# Patient Record
Sex: MALE | Race: WHITE | NOT HISPANIC OR LATINO | Employment: OTHER | URBAN - METROPOLITAN AREA
[De-identification: names, ages, dates, MRNs, and addresses within clinical notes are randomized per-mention and may not be internally consistent; named-entity substitution may affect disease eponyms.]

---

## 2018-05-14 ENCOUNTER — APPOINTMENT (OUTPATIENT)
Dept: RADIOLOGY | Facility: CLINIC | Age: 48
End: 2018-05-14
Payer: COMMERCIAL

## 2018-05-14 ENCOUNTER — OFFICE VISIT (OUTPATIENT)
Dept: OBGYN CLINIC | Facility: CLINIC | Age: 48
End: 2018-05-14
Payer: COMMERCIAL

## 2018-05-14 VITALS
SYSTOLIC BLOOD PRESSURE: 105 MMHG | HEART RATE: 85 BPM | HEIGHT: 70 IN | BODY MASS INDEX: 28.58 KG/M2 | DIASTOLIC BLOOD PRESSURE: 71 MMHG | WEIGHT: 199.6 LBS

## 2018-05-14 DIAGNOSIS — M25.511 RIGHT SHOULDER PAIN, UNSPECIFIED CHRONICITY: ICD-10-CM

## 2018-05-14 DIAGNOSIS — M25.511 RIGHT SHOULDER PAIN, UNSPECIFIED CHRONICITY: Primary | ICD-10-CM

## 2018-05-14 PROCEDURE — 73030 X-RAY EXAM OF SHOULDER: CPT

## 2018-05-14 PROCEDURE — 99204 OFFICE O/P NEW MOD 45 MIN: CPT | Performed by: ORTHOPAEDIC SURGERY

## 2018-05-14 NOTE — PROGRESS NOTES
Assessment/Plan:  1  Right shoulder pain, unspecified chronicity  XR shoulder 2+ vw right    MRI shoulder right wo contrast       Caleb Blevins has a right shoulder which likely has a traumatic rotator cuff tear  He is profoundly weak in testing abduction  He did have a traumatic incident that occurred when he was going off of a higher ledge into the water  We spoke about possible need for rotator cuff repair surgery  He will follow up with me after the MRI has been done and read  Since he lives quite a distance from here, we will have him follow up over the phone for his results initially  Subjective:   Mayra Sapp is a 50 y o  male who presents with right shoulder pain over the past 4 weeks  He was on vacation in [facility] when he jumped off of a ledge into water and landed with his arms outstretched  He unfortunately has had pain into the right shoulder and weakness ever since  He has difficulty with abduction  His pain is a sharp and moderate and intermittent discomfort  It is better with rest but can radiate down the arm  It is worse with abduction  He notices significant weakness  Review of Systems   Constitutional: Negative for chills, fever and unexpected weight change  HENT: Negative for hearing loss, nosebleeds and sore throat  Eyes: Negative for pain, redness and visual disturbance  Respiratory: Negative for cough, shortness of breath and wheezing  Cardiovascular: Negative for chest pain, palpitations and leg swelling  Gastrointestinal: Negative for abdominal pain, nausea and vomiting  Endocrine: Negative for polyphagia and polyuria  Genitourinary: Negative for dysuria and hematuria  Musculoskeletal:        See HPI   Skin: Negative for rash and wound  Neurological: Negative for dizziness, numbness and headaches  Psychiatric/Behavioral: Negative for decreased concentration and suicidal ideas  The patient is not nervous/anxious  No past medical history on file      Past Surgical History:   Procedure Laterality Date    FEMUR FRACTURE SURGERY      WRIST SURGERY         Family History   Problem Relation Age of Onset    Macular degeneration Mother     Emphysema Father        Social History     Occupational History    Not on file  Social History Main Topics    Smoking status: Never Smoker    Smokeless tobacco: Never Used    Alcohol use Yes      Comment: RARE    Drug use: No    Sexual activity: Not on file       No current outpatient prescriptions on file  No Known Allergies    Objective:  Vitals:    05/14/18 1103   BP: 105/71   Pulse: 85       Right Shoulder Exam     Tenderness   The patient is experiencing no tenderness  Range of Motion   Active Abduction:  120 abnormal   Forward Flexion:  130 abnormal   External Rotation: normal   Internal Rotation 0 degrees:  L3 abnormal     Muscle Strength   Abduction: 3/5   Internal Rotation: 5/5   External Rotation: 5/5     Tests   Drop Arm: positive  Hawkin's test: negative  Impingement: negative    Other   Sensation: normal  Pulse: present    Comments:  I can palpate the pectoralis muscle quite readily and is nontender  Physical Exam   Constitutional: He is oriented to person, place, and time  Vital signs are normal  He appears well-developed and well-nourished  HENT:   Head: Normocephalic and atraumatic  Eyes: Conjunctivae and EOM are normal    Neck: Normal range of motion  Cardiovascular: Intact distal pulses  Pulmonary/Chest: Effort normal  No respiratory distress  Neurological: He is alert and oriented to person, place, and time  Skin: Skin is warm and dry  Psychiatric: He has a normal mood and affect   His behavior is normal  Judgment and thought content normal        I have personally reviewed pertinent films in PACS and my interpretation is as follows:  Right shoulder x-rays with mild arthritic change of the glenohumeral joint

## 2018-05-16 ENCOUNTER — HOSPITAL ENCOUNTER (OUTPATIENT)
Dept: MRI IMAGING | Facility: HOSPITAL | Age: 48
Discharge: HOME | End: 2018-05-16
Attending: ORTHOPAEDIC SURGERY
Payer: COMMERCIAL

## 2018-05-16 DIAGNOSIS — M25.511 RIGHT SHOULDER PAIN, UNSPECIFIED CHRONICITY: ICD-10-CM

## 2018-05-16 PROCEDURE — 73221 MRI JOINT UPR EXTREM W/O DYE: CPT

## 2018-05-30 ENCOUNTER — OFFICE VISIT (OUTPATIENT)
Dept: OBGYN CLINIC | Facility: CLINIC | Age: 48
End: 2018-05-30
Payer: COMMERCIAL

## 2018-05-30 VITALS
HEIGHT: 70 IN | HEART RATE: 74 BPM | BODY MASS INDEX: 27.43 KG/M2 | DIASTOLIC BLOOD PRESSURE: 69 MMHG | WEIGHT: 191.6 LBS | SYSTOLIC BLOOD PRESSURE: 107 MMHG

## 2018-05-30 DIAGNOSIS — S43.431A SUPERIOR GLENOID LABRUM LESION OF RIGHT SHOULDER, INITIAL ENCOUNTER: Primary | ICD-10-CM

## 2018-05-30 PROBLEM — S46.111D RUPTURE LONG HEAD BICEPS TENDON, RIGHT, SUBSEQUENT ENCOUNTER: Status: ACTIVE | Noted: 2018-05-30

## 2018-05-30 PROBLEM — M75.111 PARTIAL NONTRAUMATIC TEAR OF RIGHT ROTATOR CUFF: Status: ACTIVE | Noted: 2018-05-30

## 2018-05-30 PROCEDURE — 99214 OFFICE O/P EST MOD 30 MIN: CPT | Performed by: ORTHOPAEDIC SURGERY

## 2018-05-30 NOTE — PROGRESS NOTES
Assessment/Plan:  1  Superior glenoid labrum lesion of right shoulder, initial encounter       Scribe Attestation    I,:   Rikki Norman am acting as a scribe while in the presence of the attending physician :        I,:   Hafsa Neumann MD personally performed the services described in this documentation    as scribed in my presence :            Lara Mcdaniels has a SLAP tear of the glenoid labrum, near complete tear of the long head of biceps tendon, moderate glenohumeral joint osteoarthritis, and a tiny interstitial tear in the posterior supraspinatus of his right shoulder  At this time, he would like to proceed with surgery  I will perform a Right Shoulder Arthroscopy with Biceps Tendonesis with a  possible rotator cuff repair  I spoke to Lara Mcdaniels that I will have to make sure the biceps tendon is still attached in order to repair it  I did let him know that if the biceps is not found intra-articularly, I would not attempt to find it extra-articularly and perform a tenodesis more distally in the arm  We would debride any labral pathology and evaluate for rotator cuff pathology if that was the case  He understood  I spoke to him that he will be in a sling for 3 weeks and then begin physical therapy  Lara Mcdaniels understood the risks associated with the surgery which are: bleeding, infection, blood clots, nerve injury, persistent stiffness and weakness, failure to achieve the anticipated results and the need for recurrent surgery  He understood and had no further questions  Surgery will be scheduled  Subjective:   Cruz Blanchard is a 50 y o  male who presents here for his right shoulder pain  His pain is a sharp and moderate and intermittent discomfort  It is better with rest but can radiate down the arm  It is worse with abduction  He notices significant weakness  He injured his arm while in [facility] on vacation  Review of Systems   Constitutional: Negative  HENT: Negative  Eyes: Negative  Respiratory: Negative  Cardiovascular: Negative  Gastrointestinal: Negative  Endocrine: Negative  Genitourinary: Negative  Musculoskeletal: Negative  As noted in HPI   Skin: Negative  Allergic/Immunologic: Negative  Neurological: Negative  Hematological: Negative  Psychiatric/Behavioral: Negative  All other systems reviewed and are negative  History reviewed  No pertinent past medical history  Past Surgical History:   Procedure Laterality Date    FEMUR FRACTURE SURGERY      WRIST SURGERY         Family History   Problem Relation Age of Onset    Macular degeneration Mother     Emphysema Father        Social History     Occupational History    Not on file  Social History Main Topics    Smoking status: Never Smoker    Smokeless tobacco: Never Used    Alcohol use Yes      Comment: RARE    Drug use: No    Sexual activity: Not on file       No current outpatient prescriptions on file  No Known Allergies    Objective:  Vitals:    05/30/18 0850   BP: 107/69   Pulse: 74       Right Shoulder Exam     Tenderness   The patient is experiencing no tenderness  Range of Motion   Forward Flexion: 20   Internal Rotation 0 degrees: L1     Muscle Strength   Abduction: 5/5   Internal Rotation: 5/5   External Rotation: 5/5   Supraspinatus: 5/5   Subscapularis: 5/5   Biceps: 5/5     Other   Erythema: absent  Scars: absent  Sensation: normal  Pulse: present    Comments:  + [employer]            Physical Exam   Constitutional: He is oriented to person, place, and time  Vital signs are normal  He appears well-developed and well-nourished  HENT:   Head: Normocephalic and atraumatic  Eyes: Conjunctivae and EOM are normal  Pupils are equal, round, and reactive to light  Neck: Normal range of motion  Cardiovascular: Normal rate, normal heart sounds and intact distal pulses  Pulmonary/Chest: Effort normal  No respiratory distress  He has wheezes  Musculoskeletal: Normal range of motion  Neurological: He is alert and oriented to person, place, and time  Skin: Skin is warm and dry  Psychiatric: He has a normal mood and affect  His behavior is normal  Judgment and thought content normal        I have personally reviewed pertinent films in PACS and my interpretation is as follows:  MRI of Kendrick's right shoulder show a SLAP tear of the glenoid labrum, near complete tear of the long head of biceps tendon, moderate glenohumeral joint osteoarthritis, and a tiny interstitial tear in the posterior supraspinatus

## 2018-06-15 NOTE — PRE-PROCEDURE INSTRUCTIONS
No outpatient prescriptions have been marked as taking for the 6/21/18 encounter [facility] HOSPITAL Encounter)  Pre op instructions given  Information for On Q use and removal given   wife Alejandra Morris [phone] Surgical Experience    The following information was developed to assist you to prepare for your operation  What do I need to do before coming to the hospital?   Arrange for a responsible person to drive you to and from the hospital    Arrange care for your children at home  Children are not allowed in the recovery areas of the hospital   Plan to wear clothing that is easy to put on and take off  If you are having shoulder surgery, wear a shirt that buttons or zippers in the front  Bathing  o Shower the evening before and the morning of your surgery with an antibacterial soap  Please refer to the Pre Op Showering Instructions for Surgery Patients Sheet   o Remove nail polish and all body piercing jewelry  o Do not shave any body part for at least 24 hours before surgery-this includes face, arms, legs and upper body  Food  o Nothing to eat or drink after midnight the night before your surgery  This includes candy and chewing gum  o Exception: If your surgery is after 12:00pm (noon), you may have clear liquids such as 7-Up®, ginger ale, apple or cranberry juice, Jell-O®, water, or clear broth until 8:00 am  o Do not drink milk or juice with pulp on the morning before surgery  o Do not drink alcohol 24 hours before surgery  Medicine  o Follow instructions you received from your surgeon about which medicines you may take on the day of surgery  o If instructed to take medicine on the morning of surgery, take pills with just a small sip of water   Call your prescribing doctor for specific infroamtion on what to do if you take insulin    What should I bring to the hospital?    Bring:  Camelia Bouquet or a walker, if you have them, for foot or knee surgery   A list of the daily medicines, vitamins, minerals, herbals and nutritional supplements you take  Include the dosages of medicines and the time you take them each day   Glasses, dentures or hearing aids   Minimal clothing; you will be wearing hospital sleepwear   Photo ID; required to verify your identity   If you have a Living Will or Power of , bring a copy of the documents   If you have an ostomy, bring an extra pouch and any supplies you use    Do not bring   Medicines or inhalers   Money, valuables or jewelry    What other information should I know about the day of surgery?  Notify your surgeons if you develop a cold, sore throat, cough, fever, rash or any other illness   Report to the Ambulatory Surgical/Same Day Surgery Unit   You will be instructed to stop at Registration only if you have not been pre-registered   Inform your  fi they do not stay that they will be asked by the staff to leave a phone number where they can be reached   Be available to be reached before surgery  In the event the operating room schedule changes, you may be asked to come in earlier or later than expected    *It is important to tell your doctor and others involved in your health care if you are taking or have been taking any non-prescription drugs, vitamins, minerals, herbals or other nutritional supplements   Any of these may interact with some food or medicines and cause a reaction

## 2018-06-19 ENCOUNTER — ANESTHESIA EVENT (OUTPATIENT)
Dept: PERIOP | Facility: HOSPITAL | Age: 48
End: 2018-06-19
Payer: COMMERCIAL

## 2018-06-19 DIAGNOSIS — S46.111D RUPTURE LONG HEAD BICEPS TENDON, RIGHT, SUBSEQUENT ENCOUNTER: Primary | ICD-10-CM

## 2018-06-20 NOTE — ANESTHESIA PREPROCEDURE EVALUATION
Review of Systems/Medical History  Patient summary reviewed  Chart reviewed      Cardiovascular  Exercise tolerance (METS): >4,     Pulmonary  Not a smoker , No recent URI ,        GI/Hepatic            Endo/Other     GYN       Hematology   Musculoskeletal       Neurology   Psychology           Physical Exam    Airway    Mallampati score: II  TM Distance: >3 FB  Neck ROM: full     Dental       Cardiovascular  Rhythm: regular, Rate: normal,     Pulmonary  Breath sounds clear to auscultation,     Other Findings        Anesthesia Plan  ASA Score- 1     Anesthesia Type- IV sedation with anesthesia and regional with ASA Monitors  Additional Monitors:   Airway Plan:         Plan Factors-    Induction- intravenous  Postoperative Plan-     Informed Consent- Anesthetic plan and risks discussed with patient  I personally reviewed this patient with the CRNA  Discussed and agreed on the Anesthesia Plan with the CRNA  Yonathan Menjivar

## 2018-06-21 ENCOUNTER — HOSPITAL ENCOUNTER (OUTPATIENT)
Facility: HOSPITAL | Age: 48
Setting detail: OUTPATIENT SURGERY
Discharge: HOME | End: 2018-06-21
Attending: ORTHOPAEDIC SURGERY | Admitting: ORTHOPAEDIC SURGERY
Payer: COMMERCIAL

## 2018-06-21 ENCOUNTER — ANESTHESIA (OUTPATIENT)
Dept: PERIOP | Facility: HOSPITAL | Age: 48
End: 2018-06-21
Payer: COMMERCIAL

## 2018-06-21 VITALS
WEIGHT: 190 LBS | RESPIRATION RATE: 18 BRPM | BODY MASS INDEX: 27.26 KG/M2 | SYSTOLIC BLOOD PRESSURE: 148 MMHG | TEMPERATURE: 97 F | OXYGEN SATURATION: 97 % | DIASTOLIC BLOOD PRESSURE: 96 MMHG | HEART RATE: 66 BPM

## 2018-06-21 PROCEDURE — 29823 SHO ARTHRS SRG XTNSV DBRDMT: CPT | Performed by: ORTHOPAEDIC SURGERY

## 2018-06-21 PROCEDURE — 29823 SHO ARTHRS SRG XTNSV DBRDMT: CPT | Performed by: PHYSICIAN ASSISTANT

## 2018-06-21 RX ORDER — SODIUM CHLORIDE 9 MG/ML
75 INJECTION, SOLUTION INTRAVENOUS CONTINUOUS
Status: DISCONTINUED | OUTPATIENT
Start: 2018-06-21 | End: 2018-06-21 | Stop reason: HOSPADM

## 2018-06-21 RX ORDER — ONDANSETRON 2 MG/ML
4 INJECTION INTRAMUSCULAR; INTRAVENOUS ONCE AS NEEDED
Status: DISCONTINUED | OUTPATIENT
Start: 2018-06-21 | End: 2018-06-21 | Stop reason: HOSPADM

## 2018-06-21 RX ORDER — LIDOCAINE HYDROCHLORIDE 10 MG/ML
INJECTION, SOLUTION INFILTRATION; PERINEURAL AS NEEDED
Status: DISCONTINUED | OUTPATIENT
Start: 2018-06-21 | End: 2018-06-21 | Stop reason: SURG

## 2018-06-21 RX ORDER — DEXAMETHASONE SODIUM PHOSPHATE 4 MG/ML
INJECTION, SOLUTION INTRA-ARTICULAR; INTRALESIONAL; INTRAMUSCULAR; INTRAVENOUS; SOFT TISSUE AS NEEDED
Status: DISCONTINUED | OUTPATIENT
Start: 2018-06-21 | End: 2018-06-21 | Stop reason: SURG

## 2018-06-21 RX ORDER — HYDROMORPHONE HCL 110MG/55ML
0.5 PATIENT CONTROLLED ANALGESIA SYRINGE INTRAVENOUS
Status: DISCONTINUED | OUTPATIENT
Start: 2018-06-21 | End: 2018-06-21 | Stop reason: HOSPADM

## 2018-06-21 RX ORDER — EPINEPHRINE 1 MG/ML
INJECTION, SOLUTION, CONCENTRATE INTRAVENOUS AS NEEDED
Status: DISCONTINUED | OUTPATIENT
Start: 2018-06-21 | End: 2018-06-21 | Stop reason: SURG

## 2018-06-21 RX ORDER — PROPOFOL 10 MG/ML
INJECTION, EMULSION INTRAVENOUS CONTINUOUS PRN
Status: DISCONTINUED | OUTPATIENT
Start: 2018-06-21 | End: 2018-06-21 | Stop reason: SURG

## 2018-06-21 RX ORDER — FENTANYL CITRATE 50 UG/ML
INJECTION, SOLUTION INTRAMUSCULAR; INTRAVENOUS AS NEEDED
Status: DISCONTINUED | OUTPATIENT
Start: 2018-06-21 | End: 2018-06-21 | Stop reason: SURG

## 2018-06-21 RX ORDER — KETAMINE HYDROCHLORIDE 50 MG/ML
INJECTION, SOLUTION, CONCENTRATE INTRAMUSCULAR; INTRAVENOUS AS NEEDED
Status: DISCONTINUED | OUTPATIENT
Start: 2018-06-21 | End: 2018-06-21 | Stop reason: SURG

## 2018-06-21 RX ORDER — FENTANYL CITRATE/PF 50 MCG/ML
25 SYRINGE (ML) INJECTION
Status: DISCONTINUED | OUTPATIENT
Start: 2018-06-21 | End: 2018-06-21 | Stop reason: HOSPADM

## 2018-06-21 RX ORDER — OXYCODONE HYDROCHLORIDE AND ACETAMINOPHEN 5; 325 MG/1; MG/1
1 TABLET ORAL EVERY 4 HOURS PRN
Status: DISCONTINUED | OUTPATIENT
Start: 2018-06-21 | End: 2018-06-21 | Stop reason: HOSPADM

## 2018-06-21 RX ORDER — DIPHENHYDRAMINE HYDROCHLORIDE 50 MG/ML
12.5 INJECTION INTRAMUSCULAR; INTRAVENOUS ONCE AS NEEDED
Status: DISCONTINUED | OUTPATIENT
Start: 2018-06-21 | End: 2018-06-21 | Stop reason: HOSPADM

## 2018-06-21 RX ORDER — ROPIVACAINE HYDROCHLORIDE 5 MG/ML
INJECTION, SOLUTION EPIDURAL; INFILTRATION; PERINEURAL AS NEEDED
Status: DISCONTINUED | OUTPATIENT
Start: 2018-06-21 | End: 2018-06-21 | Stop reason: SURG

## 2018-06-21 RX ORDER — PROPOFOL 10 MG/ML
INJECTION, EMULSION INTRAVENOUS AS NEEDED
Status: DISCONTINUED | OUTPATIENT
Start: 2018-06-21 | End: 2018-06-21 | Stop reason: SURG

## 2018-06-21 RX ADMIN — PROPOFOL 100 MG: 10 INJECTION, EMULSION INTRAVENOUS at 11:26

## 2018-06-21 RX ADMIN — KETAMINE HYDROCHLORIDE 20 MG: 50 INJECTION INTRAMUSCULAR; INTRAVENOUS at 11:20

## 2018-06-21 RX ADMIN — DEXAMETHASONE SODIUM PHOSPHATE 4 MG: 4 INJECTION, SOLUTION INTRA-ARTICULAR; INTRALESIONAL; INTRAMUSCULAR; INTRAVENOUS; SOFT TISSUE at 10:45

## 2018-06-21 RX ADMIN — FENTANYL CITRATE 25 MCG: 50 INJECTION, SOLUTION INTRAMUSCULAR; INTRAVENOUS at 11:30

## 2018-06-21 RX ADMIN — SODIUM CHLORIDE 75 ML/HR: 0.9 INJECTION, SOLUTION INTRAVENOUS at 09:21

## 2018-06-21 RX ADMIN — FENTANYL CITRATE 25 MCG: 50 INJECTION, SOLUTION INTRAMUSCULAR; INTRAVENOUS at 11:35

## 2018-06-21 RX ADMIN — PROPOFOL 20 MG: 10 INJECTION, EMULSION INTRAVENOUS at 11:25

## 2018-06-21 RX ADMIN — PROPOFOL 100 MG: 10 INJECTION, EMULSION INTRAVENOUS at 11:20

## 2018-06-21 RX ADMIN — PROPOFOL 200 MCG/KG/MIN: 10 INJECTION, EMULSION INTRAVENOUS at 11:20

## 2018-06-21 RX ADMIN — EPINEPHRINE 0.2 MG: 1 INJECTION, SOLUTION INTRAMUSCULAR; SUBCUTANEOUS at 10:45

## 2018-06-21 RX ADMIN — CEFAZOLIN SODIUM 2000 MG: 2 SOLUTION INTRAVENOUS at 11:14

## 2018-06-21 RX ADMIN — LIDOCAINE HYDROCHLORIDE 20 MG: 10 INJECTION, SOLUTION INFILTRATION; PERINEURAL at 11:20

## 2018-06-21 RX ADMIN — ROPIVACAINE HYDROCHLORIDE 30 ML: 5 INJECTION, SOLUTION EPIDURAL; INFILTRATION; PERINEURAL at 10:45

## 2018-06-21 RX ADMIN — KETAMINE HYDROCHLORIDE 10 MG: 50 INJECTION INTRAMUSCULAR; INTRAVENOUS at 11:25

## 2018-06-21 NOTE — H&P (VIEW-ONLY)
Assessment/Plan:  1  Superior glenoid labrum lesion of right shoulder, initial encounter       Scribe Attestation    I,:   Veronica Bullard am acting as a scribe while in the presence of the attending physician :        I,:   Tano Laguna MD personally performed the services described in this documentation    as scribed in my presence :            Josue Donald has a SLAP tear of the glenoid labrum, near complete tear of the long head of biceps tendon, moderate glenohumeral joint osteoarthritis, and a tiny interstitial tear in the posterior supraspinatus of his right shoulder  At this time, he would like to proceed with surgery  I will perform a Right Shoulder Arthroscopy with Biceps Tendonesis with a  possible rotator cuff repair  I spoke to Josue Donald that I will have to make sure the biceps tendon is still attached in order to repair it  I did let him know that if the biceps is not found intra-articularly, I would not attempt to find it extra-articularly and perform a tenodesis more distally in the arm  We would debride any labral pathology and evaluate for rotator cuff pathology if that was the case  He understood  I spoke to him that he will be in a sling for 3 weeks and then begin physical therapy  Josue Donald understood the risks associated with the surgery which are: bleeding, infection, blood clots, nerve injury, persistent stiffness and weakness, failure to achieve the anticipated results and the need for recurrent surgery  He understood and had no further questions  Surgery will be scheduled  Subjective:   Mariposa Tsai is a 50 y o  male who presents here for his right shoulder pain  His pain is a sharp and moderate and intermittent discomfort  It is better with rest but can radiate down the arm  It is worse with abduction  He notices significant weakness  He injured his arm while in [facility] on vacation  Review of Systems   Constitutional: Negative  HENT: Negative  Eyes: Negative  Respiratory: Negative  Cardiovascular: Negative  Gastrointestinal: Negative  Endocrine: Negative  Genitourinary: Negative  Musculoskeletal: Negative  As noted in HPI   Skin: Negative  Allergic/Immunologic: Negative  Neurological: Negative  Hematological: Negative  Psychiatric/Behavioral: Negative  All other systems reviewed and are negative  History reviewed  No pertinent past medical history  Past Surgical History:   Procedure Laterality Date    FEMUR FRACTURE SURGERY      WRIST SURGERY         Family History   Problem Relation Age of Onset    Macular degeneration Mother     Emphysema Father        Social History     Occupational History    Not on file  Social History Main Topics    Smoking status: Never Smoker    Smokeless tobacco: Never Used    Alcohol use Yes      Comment: RARE    Drug use: No    Sexual activity: Not on file       No current outpatient prescriptions on file  No Known Allergies    Objective:  Vitals:    05/30/18 0850   BP: 107/69   Pulse: 74       Right Shoulder Exam     Tenderness   The patient is experiencing no tenderness  Range of Motion   Forward Flexion: 20   Internal Rotation 0 degrees: L1     Muscle Strength   Abduction: 5/5   Internal Rotation: 5/5   External Rotation: 5/5   Supraspinatus: 5/5   Subscapularis: 5/5   Biceps: 5/5     Other   Erythema: absent  Scars: absent  Sensation: normal  Pulse: present    Comments:  + [place]'s            Physical Exam   Constitutional: He is oriented to person, place, and time  Vital signs are normal  He appears well-developed and well-nourished  HENT:   Head: Normocephalic and atraumatic  Eyes: Conjunctivae and EOM are normal  Pupils are equal, round, and reactive to light  Neck: Normal range of motion  Cardiovascular: Normal rate, normal heart sounds and intact distal pulses  Pulmonary/Chest: Effort normal  No respiratory distress  He has wheezes  Musculoskeletal: Normal range of motion  Neurological: He is alert and oriented to person, place, and time  Skin: Skin is warm and dry  Psychiatric: He has a normal mood and affect  His behavior is normal  Judgment and thought content normal        I have personally reviewed pertinent films in PACS and my interpretation is as follows:  MRI of Kendrick's right shoulder show a SLAP tear of the glenoid labrum, near complete tear of the long head of biceps tendon, moderate glenohumeral joint osteoarthritis, and a tiny interstitial tear in the posterior supraspinatus

## 2018-06-21 NOTE — PERIOPERATIVE NURSING NOTE
Patient received from PACU via stretcher  Awake and alert  Tolerating PO fluids  Right shoulder dressing dry and intact  Right arm sling in place  Fingers to right hand warm with good blanching noted  Ice pack maintained to right shoulder  Verbal and written D/C instructions given to patient and family with both verbalizing understanding

## 2018-06-21 NOTE — DISCHARGE INSTRUCTIONS
Instruction Sheet following shoulder arthroscopy     Sling:   Wear your sling for 48 hours after your surgery  You may use your arm as tolerated for activities of daily living once the sling is discontinued  You should avoid overhead activities with your arm  Additionally, you should not carry anything heavier than a pencil in your hand  Dressing:   Remove all cotton and yellow gauze dressings 48 hours after your surgery  Until then, keep your dressings clean and dry  If in place, leave the steri-strips on your incision (the small pieces of white tape), then cover all incisions with large Band-aids  Showering: You may shower 48 hours after surgery  Please use CAUTION!! Be careful not to slip and fall  The effects of anesthesia and/or medication may make you drowsy or light-headed  Do not soak in a bathtub, hot tub, or pool until the doctor tells you it is O K  to do so  Once you are done showering pat the incision dry and cover all incisions with large Band-aids  Ice:   You can ice the shoulder to reduce swelling and discomfort  Do not ice the shoulder more than 20 minutes at a time  Let the shoulder warm up before reapplication  Avoid getting your incisions wet  Follow-up visit:   You need to see the doctor 7-10 days following surgery for your first post-op visit  At that time your sutures (stitches) will be removed  You will be given a prescription to begin physical therapy  Pendulum exercises should be begun on the first postoperative day  Common Concerns:   Bruising and/or swelling of the shoulder region are common after surgery  To relieve this discomfort it is best to ice the shoulder  You may also get swelling in the hand, which is also common after surgery  Please call if:   1  Any oozing or redness of the wound, fevers (>101  5oF), or chills  2  Any difficulty breathing or heaviness in the chest      REMEMBER - these are only guidelines   If you have any questions or concerns please do not hesitate to call the office at any time [phone]
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
## 2018-06-22 NOTE — ANESTHESIA POST-OP FOLLOW-UP NOTE
Patient: Gene Peel  Procedure(s):  RIGHT SHOULDER ARTHROSCOPIC EXTENSIVE DEBRIDEMENT OF LABRAL TEAR  RIGHT SHOULDER EXTENSIVE DEBRIDEMENT ROTATOR CUFF  ARTHROSCOPIC  Vitals:    06/21/18 1310   BP: 148/96   Pulse: 66   Resp: 18   Temp:    SpO2: 97%       Call placed at 15:06  Patient did not  the phone    Will try again in one hour

## 2018-06-23 NOTE — ANESTHESIA POST-OP FOLLOW-UP NOTE
Patient: Alejandra La  Procedure(s):  RIGHT SHOULDER ARTHROSCOPIC EXTENSIVE DEBRIDEMENT OF LABRAL TEAR  RIGHT SHOULDER EXTENSIVE DEBRIDEMENT ROTATOR CUFF  ARTHROSCOPIC  Vitals:    06/21/18 1310   BP: 148/96   Pulse: 66   Resp: 18   Temp:    SpO2: 97%         Phone call on 6/22/18: Spoke with patient  Mr Elisha Salcido pain is well controlled  He is taking pain medications as prescribed  Recommendations given to alert the the anesthesia team for possible future procedures and his increased anesthesia requirements

## 2018-06-29 ENCOUNTER — OFFICE VISIT (OUTPATIENT)
Dept: OBGYN CLINIC | Facility: CLINIC | Age: 48
End: 2018-06-29

## 2018-06-29 DIAGNOSIS — M19.011 PRIMARY OSTEOARTHRITIS OF RIGHT SHOULDER: Primary | ICD-10-CM

## 2018-06-29 PROCEDURE — 99024 POSTOP FOLLOW-UP VISIT: CPT | Performed by: ORTHOPAEDIC SURGERY

## 2018-06-29 NOTE — PROGRESS NOTES
Jaci Gonzalez returns to see me today now 1 week out from his right shoulder arthroscopic debridement of labral tear and arthritic change  He is still in his sling  I did let him know that he can advance out of the sling at this point  He states that he has mild discomfort  Physical examination:  Right shoulder has stitches removed and appears benign  Neurovascularly he is intact in the right upper extremity  Assessment:  Right shoulder osteoarthritis and labral tear    Plan austyn will start physical therapy next week and will follow up with me in 6 weeks  He can wean out of the sling  We had a discussion about possible need in the future for resurfacing hemiarthroplasty

## 2018-07-05 ENCOUNTER — EVALUATION (OUTPATIENT)
Dept: PHYSICAL THERAPY | Facility: CLINIC | Age: 48
End: 2018-07-05
Payer: COMMERCIAL

## 2018-07-05 DIAGNOSIS — M19.011 ARTHRITIS OF RIGHT SHOULDER REGION: Primary | ICD-10-CM

## 2018-07-05 PROCEDURE — 97140 MANUAL THERAPY 1/> REGIONS: CPT | Performed by: PHYSICAL THERAPIST

## 2018-07-05 PROCEDURE — G8990 OTHER PT/OT CURRENT STATUS: HCPCS | Performed by: PHYSICAL THERAPIST

## 2018-07-05 PROCEDURE — 97162 PT EVAL MOD COMPLEX 30 MIN: CPT | Performed by: PHYSICAL THERAPIST

## 2018-07-05 PROCEDURE — G8991 OTHER PT/OT GOAL STATUS: HCPCS | Performed by: PHYSICAL THERAPIST

## 2018-07-05 PROCEDURE — 97535 SELF CARE MNGMENT TRAINING: CPT | Performed by: PHYSICAL THERAPIST
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
## 2018-07-10 ENCOUNTER — OFFICE VISIT (OUTPATIENT)
Dept: PHYSICAL THERAPY | Facility: CLINIC | Age: 48
End: 2018-07-10
Payer: COMMERCIAL

## 2018-07-10 DIAGNOSIS — M19.011 ARTHRITIS OF RIGHT SHOULDER REGION: Primary | ICD-10-CM

## 2018-07-10 PROCEDURE — 97140 MANUAL THERAPY 1/> REGIONS: CPT | Performed by: PHYSICAL THERAPIST

## 2018-07-10 PROCEDURE — 97110 THERAPEUTIC EXERCISES: CPT | Performed by: PHYSICAL THERAPIST

## 2018-07-10 NOTE — PROGRESS NOTES
Daily Note     Today's date: 7/10/2018  Patient name: Bird Ferrell  : 1970  MRN: [id number]  Referring provider: Cedric Veras MD  Dx:   Encounter Diagnosis     ICD-10-CM    1  Arthritis of right shoulder region M19 011                   Subjective: " My shoulder feels a little stiff"        Objective: See treatment diary below  Manual  7/5 7/10           Right Shoulder PROM to tolerance 15' 15'                                                                   Exercise Diary   7/10           MTP/LTP   Green 5''3x10            Scap Squeezes             Shrugs Retractions              Bent Row   8# 3x10           TB Flexion, Abduction, ER, IR              ITY   Towel ! 0x each           Prone Extension  3x10           Prone Flexion   3x10           Prone Abduction   3x10           Pendulums             Pulley   5'' 10x 3 ways           Finger Ladder  5'' 10x flexion, abduction            Ammon              Doorway stretch                                                                                                Modalities                                                           Assessment: Tolerated treatment well  Patient exhibited good technique with therapeutic exercises  R shoulder abduction PROM is most limited during manual therapy,but improves with stretching         Plan: Continue per POC

## 2018-07-18 ENCOUNTER — OFFICE VISIT (OUTPATIENT)
Dept: PHYSICAL THERAPY | Facility: CLINIC | Age: 48
End: 2018-07-18
Payer: COMMERCIAL

## 2018-07-18 DIAGNOSIS — M19.011 ARTHRITIS OF RIGHT SHOULDER REGION: Primary | ICD-10-CM

## 2018-07-18 PROCEDURE — 97140 MANUAL THERAPY 1/> REGIONS: CPT

## 2018-07-18 PROCEDURE — 97110 THERAPEUTIC EXERCISES: CPT

## 2018-07-18 NOTE — PROGRESS NOTES
Daily Note     Today's date: 2018  Patient name: Stephanie Velarde  : 1970  MRN: [id number]  Referring provider: Jesica Haskins MD  Dx:   Encounter Diagnosis     ICD-10-CM    1  Arthritis of right shoulder region M19 011                   Subjective: Pt reports that he is getting some ROM back but he still has trouble with behind the back activities  Objective: See treatment diary below  Manual  7/5 7/10 7/18     Right Shoulder PROM to tolerance 15' 15' 15'                                         Exercise Diary   7/10 7/18     MTP/LTP   Green 5''3x10  Green 5''3x10     Scap Squeezes        Shrugs Retractions         Bent Row   8# 3x10 Row machine 30# 3x10     TB Flexion, Abduction, ER, IR         ITY   Towel 10x each Towel 10x each     Prone Extension  3x10 3x10     Prone Flexion   3x10 3x10     Prone Abduction   3x10 3x10     Pendulums        Pulley   5'' 10x 3 ways 5'' 10x Flex/ ABD     Finger Ladder  5'' 10x flexion, abduction  5'' 10x flexion, abduction      Pulley         Doorway stretch    30" x3     UBE   5' fwd/5' retro 80 RPM     Sleeper Stretch    30" x3                                         Modalities                                     Assessment: Tolerated treatment well  Initiated multiple self stretches  No exacerbations of symptoms  Patient demonstrated fatigue post treatment, exhibited good technique with therapeutic exercises and would benefit from continued PT      Plan: Continue per plan of care  Progress treatment as tolerated

## 2018-07-25 ENCOUNTER — OFFICE VISIT (OUTPATIENT)
Dept: PHYSICAL THERAPY | Facility: CLINIC | Age: 48
End: 2018-07-25
Payer: COMMERCIAL

## 2018-07-25 DIAGNOSIS — M19.011 ARTHRITIS OF RIGHT SHOULDER REGION: Primary | ICD-10-CM

## 2018-07-25 PROCEDURE — 97110 THERAPEUTIC EXERCISES: CPT | Performed by: PHYSICAL THERAPIST

## 2018-07-25 PROCEDURE — 97140 MANUAL THERAPY 1/> REGIONS: CPT | Performed by: PHYSICAL THERAPIST

## 2018-07-25 NOTE — PROGRESS NOTES
Daily Note     Today's date: 2018  Patient name: Geovanny Harris  : 1970  MRN: [id number]  Referring provider: Karine Denny MD  Dx:   Encounter Diagnosis     ICD-10-CM    1  Arthritis of right shoulder region M19 011        Start Time: 1000  Stop Time: 1100  Total time in clinic (min): 60 minutes    Subjective: Pt reports he is feeling pretty good today and notes the right arm continues to improve  Pt notes no new complaints at this time  Objective: See treatment diary below    Manual  7/5 7/10 7/18  7/25     Right Shoulder PROM to tolerance 15' 15' 15' 15'                                                                   Exercise Diary    7/10 7/18  7/25     MTP/LTP    Green 5''3x10  Green 5''3x10 Green 5" 3x10     Scap Squeezes             Shrugs Retractions              Bent Row    8# 3x10 Row machine 30# 3x10 Row Machine  30# 3x10     TB Flexion, Abduction, ER, IR              ITY    Towel 10x each Towel 10x each Towel 10x each     Prone Extension   3x10 3x10 3x10     Prone Flexion    3x10 3x10 3x10     Prone Abduction    3x10 3x10 3x10     Pendulums             Pulley    5'' 10x 3 ways 5'' 10x Flex/ ABD 5" 10x Flex/abd     Finger Ladder   5'' 10x flexion, abduction  5'' 10x flexion, abduction  5" 10x flex/abd     Pulley              Doorway stretch      30" x3 30" 3x     UBE     5' fwd/5' retro 80 RPM 5' fwd/5' retro  80 RPM     Sleeper Stretch      30" x3 30" 3x                                                                   Modalities                                                              Assessment: Tolerated treatment well  Patient exhibited good technique with therapeutic exercises and would benefit from continued PT  Pt with good tolerance to exercises this visit  Muscle tightness noted in the shoulder with PROM that continues to improve  Plan: Continue per plan of care  Progress treatment as tolerated

## 2018-10-11 NOTE — ADDENDUM NOTE
Addendum  created 10/11/18 0759 by Judi Peñaloza MD    Anesthesia Intra Blocks edited, Sign clinical note

## 2024-07-09 ENCOUNTER — APPOINTMENT (OUTPATIENT)
Dept: RADIOLOGY | Facility: CLINIC | Age: 54
End: 2024-07-09
Payer: COMMERCIAL

## 2024-07-09 ENCOUNTER — OFFICE VISIT (OUTPATIENT)
Dept: OBGYN CLINIC | Facility: CLINIC | Age: 54
End: 2024-07-09
Payer: COMMERCIAL

## 2024-07-09 VITALS
BODY MASS INDEX: 25.77 KG/M2 | WEIGHT: 180 LBS | SYSTOLIC BLOOD PRESSURE: 114 MMHG | HEART RATE: 73 BPM | HEIGHT: 70 IN | DIASTOLIC BLOOD PRESSURE: 77 MMHG

## 2024-07-09 DIAGNOSIS — G89.29 CHRONIC PAIN OF RIGHT KNEE: Primary | ICD-10-CM

## 2024-07-09 DIAGNOSIS — M25.461 EFFUSION OF RIGHT KNEE: ICD-10-CM

## 2024-07-09 DIAGNOSIS — M17.11 PRIMARY OSTEOARTHRITIS OF RIGHT KNEE: ICD-10-CM

## 2024-07-09 DIAGNOSIS — M25.561 RIGHT KNEE PAIN, UNSPECIFIED CHRONICITY: ICD-10-CM

## 2024-07-09 DIAGNOSIS — M25.561 CHRONIC PAIN OF RIGHT KNEE: Primary | ICD-10-CM

## 2024-07-09 PROCEDURE — 99204 OFFICE O/P NEW MOD 45 MIN: CPT | Performed by: ORTHOPAEDIC SURGERY

## 2024-07-09 PROCEDURE — 20610 DRAIN/INJ JOINT/BURSA W/O US: CPT | Performed by: ORTHOPAEDIC SURGERY

## 2024-07-09 PROCEDURE — 73564 X-RAY EXAM KNEE 4 OR MORE: CPT

## 2024-07-09 RX ORDER — LIDOCAINE HYDROCHLORIDE 10 MG/ML
2 INJECTION, SOLUTION INFILTRATION; PERINEURAL
Status: COMPLETED | OUTPATIENT
Start: 2024-07-09 | End: 2024-07-09

## 2024-07-09 RX ORDER — METHYLPREDNISOLONE ACETATE 40 MG/ML
2 INJECTION, SUSPENSION INTRA-ARTICULAR; INTRALESIONAL; INTRAMUSCULAR; SOFT TISSUE
Status: COMPLETED | OUTPATIENT
Start: 2024-07-09 | End: 2024-07-09

## 2024-07-09 RX ORDER — BUPIVACAINE HYDROCHLORIDE 2.5 MG/ML
2 INJECTION, SOLUTION INFILTRATION; PERINEURAL
Status: COMPLETED | OUTPATIENT
Start: 2024-07-09 | End: 2024-07-09

## 2024-07-09 RX ADMIN — BUPIVACAINE HYDROCHLORIDE 2 ML: 2.5 INJECTION, SOLUTION INFILTRATION; PERINEURAL at 13:30

## 2024-07-09 RX ADMIN — LIDOCAINE HYDROCHLORIDE 2 ML: 10 INJECTION, SOLUTION INFILTRATION; PERINEURAL at 13:30

## 2024-07-09 RX ADMIN — METHYLPREDNISOLONE ACETATE 2 ML: 40 INJECTION, SUSPENSION INTRA-ARTICULAR; INTRALESIONAL; INTRAMUSCULAR; SOFT TISSUE at 13:30

## 2024-07-09 NOTE — PROGRESS NOTES
Patient Name:  Kendrick Zaldivar  MRN:  [id number]    Assessment & Plan     1. Chronic pain of right knee  -     XR knee 4+ vw right injury; Future; Expected date: 07/09/2024  -     Large joint arthrocentesis: R knee  2. Primary osteoarthritis of right knee  -     Large joint arthrocentesis: R knee  3. Effusion of right knee  -     Large joint arthrocentesis: R knee      Right knee osteoarthritis and effusion  X-rays reviewed in office today with patient  Treatment options were discussed with the patient including oral and topical medications, bracing, physical therapy, corticosteroid injections, viscosupplementation  Operative management briefly discussed in the form of total knee arthroplasty.  The patient was offered an aspiration and corticosteroid injection for their right knee. They tolerated the procedure well.    The patient was educated they may have some irritation in the next few days and should rest, ice, elevate and perform gentle range of motion exercises. They were advised the medicine should begin to work in a few days time.    The patient was informed corticosteroid injections can be repeated at the earliest every 3 months.   Follow up as needed    Chief Complaint     Right knee pain    History of the Present Illness     Kendrick Zaldivar is a 54 y.o. male with Right knee pain worsening over the past 8-10 months. He admits he had a metal carl in his right femur after falling 30 feet off scaffolding in 1993. His hardware was since removed. He admits his pain is not as bad today as it has been. He takes cissus to manage symptoms.     Review of Systems     Review of Systems   Constitutional:  Negative for chills and fever.   HENT:  Negative for ear pain and sore throat.    Eyes:  Negative for pain and visual disturbance.   Respiratory:  Negative for cough and shortness of breath.    Cardiovascular:  Negative for chest pain and palpitations.   Gastrointestinal:  Negative for abdominal pain and vomiting.  "  Genitourinary:  Negative for dysuria and hematuria.   Musculoskeletal:  Negative for arthralgias and back pain.   Skin:  Negative for color change and rash.   Neurological:  Negative for seizures and syncope.   All other systems reviewed and are negative.      Physical Exam     /77   Pulse 73   Ht 5' 10\" (1.778 m)   Wt 81.6 kg (180 lb)   BMI 25.83 kg/m²     Right Knee  Range of motion from 3 to 115.    There is mild crepitus with range of motion.   There is small effusion.    There is mild tenderness over the medial joint line.    There is 5/5 quadriceps strength and equal tone.    The patient is able to perform a straight leg raise.    Varus alignment correctable with valgus stress  positive  patellar grind test.  The patient is neurovascular intact distally.      Eyes:  Anicteric sclerae.  Neck:  Supple.  Lungs:  Normal respiratory effort.  Cardiovascular:  Capillary refill is less than 2 seconds.  Skin:  Intact without erythema.  Neurologic:  Sensation grossly intact to light touch.  Psychiatric:  Mood and affect are appropriate.    Data Review     I have personally reviewed pertinent films in PACS, and my interpretation follows:    X-rays taken 7/9/2024 of Right knee independently reviewed and demonstrate severe medial moderate patellofemoral joint space narrowing. No acute fracture or dislocation    Past Medical History:   Diagnosis Date    Shoulder pain     right shoulder labreal tear       Past Surgical History:   Procedure Laterality Date    FEMUR FRACTURE SURGERY Right 1993    fell 30 feet- originally repaired with hardware then removed    [facility] SURGICAL ARTHROSCOPY SHOULDER BICEPS TENODESIS Right 6/21/2018    Procedure: RIGHT SHOULDER ARTHROSCOPIC EXTENSIVE DEBRIDEMENT OF LABRAL TEAR;  Surgeon: Jonah Armendariz MD;  Location: [facility];  Service: Orthopedics    [facility] SURGICAL ARTHROSCOPY SHOULDER W/ROTATOR CUFF RPR Right 6/21/2018    Procedure: RIGHT SHOULDER EXTENSIVE DEBRIDEMENT ROTATOR CUFF  " "ARTHROSCOPIC;  Surgeon: Jonah Armendariz MD;  Location: [facility] OR;  Service: Orthopedics    WISDOM TOOTH EXTRACTION      WRIST SURGERY Right 1993    elbow bone used for fracture repair-fell 30 feet       No Known Allergies    No current outpatient medications on file prior to visit.     No current facility-administered medications on file prior to visit.       Social History     Tobacco Use    Smoking status: Never    Smokeless tobacco: Never   Vaping Use    Vaping status: Never Used   Substance Use Topics    Alcohol use: No    Drug use: No       Family History   Problem Relation Age of Onset    Macular degeneration Mother     Emphysema Father     No Known Problems Brother     No Known Problems Brother              Procedures Performed     Large joint arthrocentesis: R knee  Universal Protocol:  Consent: Verbal consent obtained.  Risks and benefits: risks, benefits and alternatives were discussed  Consent given by: patient  Time out: Immediately prior to procedure a \"time out\" was called to verify the correct patient, procedure, equipment, support staff and site/side marked as required.  Patient understanding: patient states understanding of the procedure being performed  Site marked: the operative site was marked  Patient identity confirmed: verbally with patient  Supporting Documentation  Indications: pain   Procedure Details  Location: knee - R knee  Preparation: Patient was prepped and draped in the usual sterile fashion  Needle size: 22 G  Ultrasound guidance: no  Approach: anterolateral  Medications administered: 2 mL bupivacaine 0.25 %; 2 mL methylPREDNISolone acetate 40 mg/mL; 2 mL lidocaine 1 %    Aspirate amount: 18 mL  Aspirate: clear, serous and yellow  Patient tolerance: patient tolerated the procedure well with no immediate complications  Dressing:  Sterile dressing applied              Jamshid Levine DO  Scribe Attestation      I,:  Roslyn Ugarte am acting as a scribe while in the presence of " the attending physician.:       I,:  Jamshid Levine, DO personally performed the services described in this documentation    as scribed in my presence.:

## 2025-01-29 ENCOUNTER — OFFICE VISIT (OUTPATIENT)
Dept: OBGYN CLINIC | Facility: CLINIC | Age: 55
End: 2025-01-29
Payer: COMMERCIAL

## 2025-01-29 ENCOUNTER — APPOINTMENT (OUTPATIENT)
Dept: RADIOLOGY | Facility: CLINIC | Age: 55
End: 2025-01-29
Payer: COMMERCIAL

## 2025-01-29 VITALS — WEIGHT: 180 LBS | BODY MASS INDEX: 25.77 KG/M2 | RESPIRATION RATE: 18 BRPM | HEIGHT: 70 IN

## 2025-01-29 DIAGNOSIS — M25.562 CHRONIC PAIN OF LEFT KNEE: ICD-10-CM

## 2025-01-29 DIAGNOSIS — G89.29 CHRONIC PAIN OF LEFT KNEE: ICD-10-CM

## 2025-01-29 DIAGNOSIS — M17.11 PRIMARY OSTEOARTHRITIS OF RIGHT KNEE: Primary | ICD-10-CM

## 2025-01-29 DIAGNOSIS — M25.561 CHRONIC PAIN OF RIGHT KNEE: ICD-10-CM

## 2025-01-29 DIAGNOSIS — M17.12 PRIMARY OSTEOARTHRITIS OF LEFT KNEE: ICD-10-CM

## 2025-01-29 DIAGNOSIS — G89.29 CHRONIC PAIN OF RIGHT KNEE: ICD-10-CM

## 2025-01-29 PROCEDURE — 20610 DRAIN/INJ JOINT/BURSA W/O US: CPT | Performed by: ORTHOPAEDIC SURGERY

## 2025-01-29 PROCEDURE — 99213 OFFICE O/P EST LOW 20 MIN: CPT | Performed by: ORTHOPAEDIC SURGERY

## 2025-01-29 PROCEDURE — 73564 X-RAY EXAM KNEE 4 OR MORE: CPT

## 2025-01-29 RX ORDER — LIDOCAINE HYDROCHLORIDE 10 MG/ML
2 INJECTION, SOLUTION INFILTRATION; PERINEURAL
Status: COMPLETED | OUTPATIENT
Start: 2025-01-29 | End: 2025-01-29

## 2025-01-29 RX ORDER — BUPIVACAINE HYDROCHLORIDE 2.5 MG/ML
2 INJECTION, SOLUTION INFILTRATION; PERINEURAL
Status: COMPLETED | OUTPATIENT
Start: 2025-01-29 | End: 2025-01-29

## 2025-01-29 RX ORDER — METHYLPREDNISOLONE ACETATE 40 MG/ML
2 INJECTION, SUSPENSION INTRA-ARTICULAR; INTRALESIONAL; INTRAMUSCULAR; SOFT TISSUE
Status: COMPLETED | OUTPATIENT
Start: 2025-01-29 | End: 2025-01-29

## 2025-01-29 RX ADMIN — LIDOCAINE HYDROCHLORIDE 2 ML: 10 INJECTION, SOLUTION INFILTRATION; PERINEURAL at 14:00

## 2025-01-29 RX ADMIN — BUPIVACAINE HYDROCHLORIDE 2 ML: 2.5 INJECTION, SOLUTION INFILTRATION; PERINEURAL at 14:00

## 2025-01-29 RX ADMIN — METHYLPREDNISOLONE ACETATE 2 ML: 40 INJECTION, SUSPENSION INTRA-ARTICULAR; INTRALESIONAL; INTRAMUSCULAR; SOFT TISSUE at 14:00

## 2025-01-29 NOTE — PROGRESS NOTES
Patient Name:  Kendrick Zaldivar  MRN:  [id number]    Assessment & Plan     1. Primary osteoarthritis of right knee  -     Injection Procedure Prior Authorization; Future  -     Large joint arthrocentesis: R knee  2. Chronic pain of right knee  -     Injection Procedure Prior Authorization; Future  -     Large joint arthrocentesis: R knee  3. Chronic pain of left knee  -     XR knee 4+ vw left injury; Future; Expected date: 01/29/2025  -     Injection Procedure Prior Authorization; Future  -     Large joint arthrocentesis: L knee  4. Primary osteoarthritis of left knee  -     Injection Procedure Prior Authorization; Future  -     Large joint arthrocentesis: L knee        Bilateral knee osteoarthritis with  worsening pain  X-rays reviewed in office today with patient  Treatment options were discussed with the patient including oral and topical medications, bracing, physical therapy, corticosteroid injections, viscosupplementation  Operative management briefly discussed in the form of total knee arthroplasty.  The patient was offered corticosteroid injection for their bilateral knee. They tolerated the procedure well.    The patient was educated they may have some irritation in the next few days and should rest, ice, elevate and perform gentle range of motion exercises. They were advised the medicine should begin to work in a few days time.    The patient was informed corticosteroid injections can be repeated at the earliest every 3 months.   Preauthorization requested for bilateral Durolane injection  Follow up when visco is approved, at least 6 weeks from today    History of the Present Illness     Kendrick Zaldivar is a 54 y.o. male with Right osteoarthritis s/p aspiration and corticosteroid injection on 7/9/2024. Today, the patient reports he did well after the last injection. He had no pain for about 6 weeks. He has increased bilateral knee pain when going up and down ladders. He tries to stay active by working out at the gym.  "He is not taking any oral analgesics. He is requesting bilateral CSI today. Pain 7/10 today    Review of Systems     Review of Systems   Constitutional:  Negative for chills and fever.   HENT:  Negative for ear pain and sore throat.    Eyes:  Negative for pain and visual disturbance.   Respiratory:  Negative for cough and shortness of breath.    Cardiovascular:  Negative for chest pain and palpitations.   Gastrointestinal:  Negative for abdominal pain and vomiting.   Genitourinary:  Negative for dysuria and hematuria.   Musculoskeletal:  Negative for arthralgias and back pain.   Skin:  Negative for color change and rash.   Neurological:  Negative for seizures and syncope.   All other systems reviewed and are negative.      Physical Exam     Resp 18   Ht 5' 10\" (1.778 m)   Wt 81.6 kg (180 lb)   BMI 25.83 kg/m²     Right Knee  Range of motion from 5 to 120.    There is mild crepitus with range of motion.   There is small effusion.    There is mild tenderness over the lateral joint line.    There is 5/5 quadriceps strength and equal tone.    The patient is able to perform a straight leg raise.    Varus alignment correctable with valgus stress  positive  patellar grind test.  The patient is neurovascular intact distally.    Left Knee  Range of motion from 2 to 130.    There is no crepitus with range of motion.   There is no effusion.    There is tenderness over the medial joint line.    There is 5/5 quadriceps strength and equal tone.    The patient is able to perform a straight leg raise.    negative patellar grind test.  The patient is neurovascular intact distally.    Eyes:  Anicteric sclerae.  Neck:  Supple.  Lungs:  Normal respiratory effort.  Cardiovascular:  Capillary refill is less than 2 seconds.  Skin:  Intact without erythema.  Neurologic:  Sensation grossly intact to light touch.  Psychiatric:  Mood and affect are appropriate.    Data Review     I have personally reviewed pertinent films in PACS, and my " interpretation follows:    X-rays taken 7/9/2024 of Right knee independently reviewed and demonstrate severe medial moderate patellofemoral joint space narrowing. No acute fracture or dislocation    X-rays taken 1/29/2025 of the left knee independently reviewed and demonstrate severe medial joint space narrowing. No acute fracture or dislocation.    Past Medical History:   Diagnosis Date    Shoulder pain     right shoulder labreal tear       Past Surgical History:   Procedure Laterality Date    FEMUR FRACTURE SURGERY Right 1993    fell 30 feet- originally repaired with hardware then removed    [facility] SUR[facility]ICAL ARTHROSCOPY SHOULDER BICEPS TENODESIS Right 6/21/2018    Procedure: RIGHT SHOULDER ARTHROSCOPIC EXTENSIVE DEBRIDEMENT OF LABRAL TEAR;  Surgeon: Jonah Armendariz MD;  Location: [facility] OR;  Service: Orthopedics    [place] SURGICAL ARTHROSCOPY SHOULDER W/ROTATOR CUFF RPR Right 6/21/2018    Procedure: RIGHT SHOULDER EXTENSIVE DEBRIDEMENT ROTATOR CUFF  ARTHROSCOPIC;  Surgeon: Jonah Armendariz MD;  Location: [facility];  Service: Orthopedics    WISDOM TOOTH EXTRACTION      WRIST SURGERY Right 1993    elbow bone used for fracture repair-fell 30 feet       No Known Allergies    No current outpatient medications on file prior to visit.     No current facility-administered medications on file prior to visit.       Social History     Tobacco Use    Smoking status: Never    Smokeless tobacco: Never   Vaping Use    Vaping status: Never Used   Substance Use Topics    Alcohol use: No    Drug use: No       Family History   Problem Relation Age of Onset    Macular degeneration Mother     Emphysema Father     No Known Problems Brother     No Known Problems Brother              Procedures Performed     Large joint arthrocentesis: R knee  Universal Protocol:  Consent: Verbal consent obtained.  Risks and benefits: risks, benefits and alternatives were discussed  Consent given by: patient  Time out: Immediately prior to procedure a  "\"time out\" was called to verify the correct patient, procedure, equipment, support staff and site/side marked as required.  Patient understanding: patient states understanding of the procedure being performed  Site marked: the operative site was marked  Patient identity confirmed: verbally with patient  Supporting Documentation  Indications: pain   Procedure Details  Location: knee - R knee  Preparation: Patient was prepped and draped in the usual sterile fashion  Needle size: 22 G  Ultrasound guidance: no  Approach: anterolateral  Medications administered: 2 mL bupivacaine 0.25 %; 2 mL methylPREDNISolone acetate 40 mg/mL; 2 mL lidocaine 1 %    Patient tolerance: patient tolerated the procedure well with no immediate complications  Dressing:  Sterile dressing applied      Large joint arthrocentesis: L knee  Universal Protocol:  Consent: Verbal consent obtained.  Risks and benefits: risks, benefits and alternatives were discussed  Consent given by: patient  Time out: Immediately prior to procedure a \"time out\" was called to verify the correct patient, procedure, equipment, support staff and site/side marked as required.  Patient understanding: patient states understanding of the procedure being performed  Site marked: the operative site was marked  Patient identity confirmed: verbally with patient  Supporting Documentation  Indications: pain   Procedure Details  Location: knee - L knee  Preparation: Patient was prepped and draped in the usual sterile fashion  Needle size: 22 G  Ultrasound guidance: no  Approach: anterolateral  Medications administered: 2 mL bupivacaine 0.25 %; 2 mL methylPREDNISolone acetate 40 mg/mL; 2 mL lidocaine 1 %    Patient tolerance: patient tolerated the procedure well with no immediate complications  Dressing:  Sterile dressing applied              Roslyn Ugarte  Scribe Attestation      I,:  Roslyn Ugarte am acting as a scribe while in the presence of the attending physician.:       I,: "  Jamshid Levine,  personally performed the services described in this documentation    as scribed in my presence.:

## 2025-02-25 ENCOUNTER — TELEPHONE (OUTPATIENT)
Dept: OBGYN CLINIC | Facility: HOSPITAL | Age: 55
End: 2025-02-25

## 2025-02-25 NOTE — TELEPHONE ENCOUNTER
Caller: Patient    Doctor: Dr. Levine    Reason for call: Patient returning a call to confirm that he does want to proceed with knee injection.    Call back#: [phone]

## 2025-03-05 ENCOUNTER — TELEPHONE (OUTPATIENT)
Age: 55
End: 2025-03-05

## 2025-03-05 NOTE — TELEPHONE ENCOUNTER
YESTERDAY I LEFT MESSAGE TO SCHEDULE HIS VISCO APPT WITH DR. RODGERS  I RETURNED PATIENT CALL AND SCHEDULED HIS VISCO APPTS.   PT HAS BEEN SCHEDULED WITH DR. RODGERS ON 3/19/2025.

## 2025-03-05 NOTE — TELEPHONE ENCOUNTER
Caller: Patient    Doctor/office: Dr Levine    #: [phone]       Patient is Returning a missed called to start auth/delivery for VISCO(Gel) injections. States the call was today // no call logged from today //Please advise    Body Part: Michael knees  Pain Level (scale 1-10): Rt knee 8 Lt Knee 4  Date of last Gel Injection: never        Educated patient on Visco procedure. Auth team will review insurance and process the request appropriately. Patient will be contacted if the have any questions and when ready to schedule.

## 2025-03-19 ENCOUNTER — PROCEDURE VISIT (OUTPATIENT)
Dept: OBGYN CLINIC | Facility: CLINIC | Age: 55
End: 2025-03-19
Payer: COMMERCIAL

## 2025-03-19 VITALS — HEIGHT: 70 IN | BODY MASS INDEX: 25.77 KG/M2 | WEIGHT: 180 LBS

## 2025-03-19 DIAGNOSIS — M17.11 PRIMARY OSTEOARTHRITIS OF RIGHT KNEE: Primary | ICD-10-CM

## 2025-03-19 DIAGNOSIS — M17.12 PRIMARY OSTEOARTHRITIS OF LEFT KNEE: ICD-10-CM

## 2025-03-19 PROCEDURE — 20610 DRAIN/INJ JOINT/BURSA W/O US: CPT | Performed by: PHYSICIAN ASSISTANT

## 2025-03-19 RX ORDER — IBUPROFEN 200 MG
200 TABLET ORAL AS NEEDED
COMMUNITY

## 2025-03-19 NOTE — PROGRESS NOTES
Patient has failed at least three months of conservative therapy including CSI, OTC medications , patient symptoms include pain with increased function, pain is rated at 6/10.  After discussing the options for treatment, the patient elected to proceed forward with Bilateral knee Durolane injection to reduce pain. Risks of injection, including but not limited to, post-injection pain, swelling, pseudo septic reaction, skin rash, itching, and infection were discussed in detail.  The patient understood, had no further questions and elected to proceed forward.  After sterile preparation, the  Durolane  injections was injected into the Bilateral knee, respectively  The patient tolerated the procedure well no complications were noted.  The patient was instructed ice and elevate the extremity, limit strenuous activity for the next 2-3 days, and to contact us if there were any questions or concerns prior to their follow-up appointment.  I will see the patient back 3 months.        Large joint arthrocentesis: R knee  Universal Protocol:  Risks and benefits: risks, benefits and alternatives were discussed  Consent given by: patient  Patient identity confirmed: verbally with patient  Supporting Documentation  Indications: pain   Procedure Details  Location: knee - R knee  Needle size: 22 G  Approach: lateral  Medications administered: 3 mL sodium hyaluronate 60 MG/3ML    Patient tolerance: patient tolerated the procedure well with no immediate complications  Dressing:  Sterile dressing applied      Large joint arthrocentesis: L knee  Universal Protocol:  Risks and benefits: risks, benefits and alternatives were discussed  Consent given by: patient  Patient identity confirmed: verbally with patient  Supporting Documentation  Indications: pain   Procedure Details  Location: knee - L knee  Needle size: 22 G  Approach: lateral  Medications administered: 3 mL sodium hyaluronate 60 MG/3ML    Patient tolerance: patient tolerated the  procedure well with no immediate complications  Dressing:  Sterile dressing applied

## 2025-05-28 ENCOUNTER — OFFICE VISIT (OUTPATIENT)
Dept: OBGYN CLINIC | Facility: CLINIC | Age: 55
End: 2025-05-28
Payer: COMMERCIAL

## 2025-05-28 VITALS — WEIGHT: 180 LBS | BODY MASS INDEX: 25.77 KG/M2 | HEIGHT: 70 IN

## 2025-05-28 DIAGNOSIS — M17.12 PRIMARY OSTEOARTHRITIS OF LEFT KNEE: ICD-10-CM

## 2025-05-28 DIAGNOSIS — M17.11 PRIMARY OSTEOARTHRITIS OF RIGHT KNEE: Primary | ICD-10-CM

## 2025-05-28 PROCEDURE — 99213 OFFICE O/P EST LOW 20 MIN: CPT | Performed by: ORTHOPAEDIC SURGERY

## 2025-05-28 NOTE — PROGRESS NOTES
"Name: Kendrick Zaldivar      : 1970      MRN: [id number]  Encounter Provider: Jamshid Levine DO  Encounter Date: 2025   Encounter department: [facility] [place]  :  Assessment & Plan  Primary osteoarthritis of right knee         Primary osteoarthritis of left knee               Bilateral knee osteoarthritis with pain right greater than left  Continue nonoperative treatment options were discussed in the form of oral analgesics, topical analgesics, formal physical therapy, injection therapy, and bracing.  Due to failure of nonoperative treatment to this point, we discussed surgical intervention in the form of total knee arthroplasty.  Patient would like to continue forward with nonoperative treatment at this time.  Corticosteroid injection was offered for the patient today.  He declined as he has an upcoming vacation in August and would like to have injections closer to vacation to maximize his relief.  Follow up towards the end of July for bilateral knee corticosteroid injections.      History of the Present Illness     History of Present Illness   HPI   Kendrick Zaldivar is a 54 y.o. male with Bilateral knee pain, right greater than left.  He received bilateral knee Durolane injections on 3/19/2025 with minimal relief in symptoms for short period of time.  He last received corticosteroid injections on 2025 and reports approximately 2 weeks of relief.  He states that last Monday he had severe worsening of his pain which occurred atraumatically.  Pain is worse in his right knee than his left and is worse with long periods of standing and activity.  He is going on vacation in the being of August and would like repeat injections prior to his trip.        Review of Systems     Review of Systems    Physical Exam     Objective   Ht 5' 10\" (1.778 m)   Wt 81.6 kg (180 lb)   BMI 25.83 kg/m²        Right Knee  Range of motion from 7 to 110.    There is no effusion.    There is  " "tenderness over the medial joint line.    The patient is able to perform a straight leg raise.    Varus stress testing reveals no instability at 0 and 30 degrees   Valgus stress testing reveals no instability at 0 and 30 degrees  The patient is neurovascular intact distally.    Left  Knee  Range of motion from 2 to 120.    There is no effusion.    There is tenderness over the medial joint line.    The patient is able to perform a straight leg raise.    Varus stress testing reveals no instability at 0 and 30 degrees   Valgus stress testing reveals no instability at 0 and 30 degrees  The patient is neurovascular intact distally.      Data Review     I have personally reviewed pertinent films in PACS, and my interpretation follows.    No new imaging reviewed today.    No results found for: \"HGBA1C\", \"ESR\", \"CRP\", \"LYMEIGG\", \"SYNCRYSTAL\"    Social History[1]        Procedures  None    Jamshid Levine DO           [1]   Social History  Tobacco Use    Smoking status: Never    Smokeless tobacco: Never   Vaping Use    Vaping status: Never Used   Substance Use Topics    Alcohol use: No    Drug use: No     "

## 2025-06-05 ENCOUNTER — TELEPHONE (OUTPATIENT)
Dept: OBGYN CLINIC | Facility: CLINIC | Age: 55
End: 2025-06-05

## 2025-06-05 NOTE — TELEPHONE ENCOUNTER
LVM for patient indicated that appointment for 7/23 at 4 pm was moved to the follow Wednesday on 7/30/25 at 4 pm due to provider being out of the office on the 23. If new appointment doesn't work for patient left call back number [phone] to reschedule if needed.

## 2025-07-30 ENCOUNTER — OFFICE VISIT (OUTPATIENT)
Dept: OBGYN CLINIC | Facility: CLINIC | Age: 55
End: 2025-07-30
Payer: COMMERCIAL

## 2025-07-30 VITALS — BODY MASS INDEX: 25.77 KG/M2 | HEIGHT: 70 IN | WEIGHT: 180 LBS

## 2025-07-30 DIAGNOSIS — M17.11 PRIMARY OSTEOARTHRITIS OF RIGHT KNEE: Primary | ICD-10-CM

## 2025-07-30 DIAGNOSIS — M17.12 PRIMARY OSTEOARTHRITIS OF LEFT KNEE: ICD-10-CM

## 2025-07-30 PROCEDURE — 20610 DRAIN/INJ JOINT/BURSA W/O US: CPT | Performed by: ORTHOPAEDIC SURGERY

## 2025-07-30 PROCEDURE — 99213 OFFICE O/P EST LOW 20 MIN: CPT | Performed by: ORTHOPAEDIC SURGERY

## 2025-07-30 RX ORDER — METHYLPREDNISOLONE ACETATE 40 MG/ML
2 INJECTION, SUSPENSION INTRA-ARTICULAR; INTRALESIONAL; INTRAMUSCULAR; SOFT TISSUE
Status: COMPLETED | OUTPATIENT
Start: 2025-07-30 | End: 2025-07-30

## 2025-07-30 RX ORDER — BUPIVACAINE HYDROCHLORIDE 2.5 MG/ML
2 INJECTION, SOLUTION INFILTRATION; PERINEURAL
Status: COMPLETED | OUTPATIENT
Start: 2025-07-30 | End: 2025-07-30

## 2025-07-30 RX ORDER — LIDOCAINE HYDROCHLORIDE 10 MG/ML
2 INJECTION, SOLUTION INFILTRATION; PERINEURAL
Status: COMPLETED | OUTPATIENT
Start: 2025-07-30 | End: 2025-07-30

## 2025-07-30 RX ADMIN — BUPIVACAINE HYDROCHLORIDE 2 ML: 2.5 INJECTION, SOLUTION INFILTRATION; PERINEURAL at 16:00

## 2025-07-30 RX ADMIN — METHYLPREDNISOLONE ACETATE 2 ML: 40 INJECTION, SUSPENSION INTRA-ARTICULAR; INTRALESIONAL; INTRAMUSCULAR; SOFT TISSUE at 16:00

## 2025-07-30 RX ADMIN — LIDOCAINE HYDROCHLORIDE 2 ML: 10 INJECTION, SOLUTION INFILTRATION; PERINEURAL at 16:00

## (undated) DEVICE — GAUZE SPONGES,16 PLY: Brand: CURITY

## (undated) DEVICE — INTENDED FOR TISSUE SEPARATION, AND OTHER PROCEDURES THAT REQUIRE A SHARP SURGICAL BLADE TO PUNCTURE OR CUT.: Brand: BARD-PARKER SAFETY BLADES SIZE 11, STERILE

## (undated) DEVICE — POSITIONER TRIMANO LIMB BEACH CHAIR

## (undated) DEVICE — VAPR COOLPULSE 90 ELECTRODE WITH HAND CONTROLS 90 DEGREES SUCTION WITH INTEGRATED HANDPIECE: Brand: VAPR COOLPULSE

## (undated) DEVICE — TUBING SUCTION 5MM X 12 FT

## (undated) DEVICE — TIBURON BEACH CHAIR SHOULDER DRAPE: Brand: CONVERTORS

## (undated) DEVICE — ASTOUND IMPERVIOUS SURGICAL GOWN: Brand: CONVERTORS

## (undated) DEVICE — TUBING ARTHROSCOPIC WAVE  MAIN PUMP

## (undated) DEVICE — CANNULA 5.75 X 70MM BARREL SHAPED BOWL

## (undated) DEVICE — OCCLUSIVE GAUZE STRIP,3% BISMUTH TRIBROMOPHENATE IN PETROLATUM BLEND: Brand: XEROFORM

## (undated) DEVICE — DUAL SPIKE ADAPTER: Brand: CONMED

## (undated) DEVICE — PACK ARTHROSCOPY

## (undated) DEVICE — Device

## (undated) DEVICE — GLOVE SRG BIOGEL 6.5

## (undated) DEVICE — GLOVE SRG BIOGEL 7.5

## (undated) DEVICE — 3M™ TEGADERM™ TRANSPARENT FILM DRESSING FRAME STYLE, 1626W, 4 IN X 4-3/4 IN (10 CM X 12 CM), 50/CT 4CT/CASE: Brand: 3M™ TEGADERM™

## (undated) DEVICE — BURR  OVAL 4MM 13CM 8 FLUTE COOLCUT

## (undated) DEVICE — SCD SEQUENTIAL COMPRESSION COMFORT SLEEVE MEDIUM KNEE LENGTH: Brand: KENDALL SCD

## (undated) DEVICE — GLOVE INDICATOR PI UNDERGLOVE SZ 7.5 BLUE

## (undated) DEVICE — CHLORAPREP HI-LITE 26ML ORANGE

## (undated) DEVICE — SYRINGE 50ML LL

## (undated) DEVICE — LABEL MEDICATION STERILE 2 YELLOW LIDOCAINE 2 BLUE MARCAINE 2 ORANGE HEPARIN

## (undated) DEVICE — BLADE SHAVER EXCALIBUR 4MM 13CM COOLCUT

## (undated) DEVICE — GLOVE INDICATOR PI UNDERGLOVE SZ 6.5 BLUE